# Patient Record
Sex: FEMALE
[De-identification: names, ages, dates, MRNs, and addresses within clinical notes are randomized per-mention and may not be internally consistent; named-entity substitution may affect disease eponyms.]

---

## 2018-05-08 ENCOUNTER — HOSPITAL ENCOUNTER (EMERGENCY)
Dept: HOSPITAL 97 - ER | Age: 43
LOS: 1 days | Discharge: HOME | End: 2018-05-09
Payer: SELF-PAY

## 2018-05-08 DIAGNOSIS — D64.9: Primary | ICD-10-CM

## 2018-05-08 DIAGNOSIS — N92.0: ICD-10-CM

## 2018-05-08 LAB
BLD SMEAR INTERP: (no result)
BUN BLD-MCNC: 14 MG/DL (ref 6–20)
GLUCOSE SERPLBLD-MCNC: 125 MG/DL (ref 65–120)
HCT VFR BLD CALC: 21.4 % (ref 36–45)
HYPOCHROMIA BLD QL: (no result)
LYMPHOCYTES # SPEC AUTO: 1.3 K/UL (ref 0.7–4.9)
MCH RBC QN AUTO: 20.8 PG (ref 27–35)
MCV RBC: 68.2 FL (ref 80–100)
MORPHOLOGY BLD-IMP: (no result)
OVALOCYTES BLD QL SMEAR: (no result)
PMV BLD: 9.1 FL (ref 7.6–11.3)
POLYCHROMASIA BLD QL SMEAR: (no result)
POTASSIUM SERPL-SCNC: 3.3 MEQ/L (ref 3.6–5)
RBC # BLD: 3.14 M/UL (ref 3.86–4.86)

## 2018-05-08 PROCEDURE — 86850 RBC ANTIBODY SCREEN: CPT

## 2018-05-08 PROCEDURE — 81025 URINE PREGNANCY TEST: CPT

## 2018-05-08 PROCEDURE — 36415 COLL VENOUS BLD VENIPUNCTURE: CPT

## 2018-05-08 PROCEDURE — 85018 HEMOGLOBIN: CPT

## 2018-05-08 PROCEDURE — 36430 TRANSFUSION BLD/BLD COMPNT: CPT

## 2018-05-08 PROCEDURE — 80048 BASIC METABOLIC PNL TOTAL CA: CPT

## 2018-05-08 PROCEDURE — 86900 BLOOD TYPING SEROLOGIC ABO: CPT

## 2018-05-08 PROCEDURE — 85025 COMPLETE CBC W/AUTO DIFF WBC: CPT

## 2018-05-08 PROCEDURE — 76830 TRANSVAGINAL US NON-OB: CPT

## 2018-05-08 PROCEDURE — 86901 BLOOD TYPING SEROLOGIC RH(D): CPT

## 2018-05-08 PROCEDURE — 99284 EMERGENCY DEPT VISIT MOD MDM: CPT

## 2018-05-08 PROCEDURE — 81003 URINALYSIS AUTO W/O SCOPE: CPT

## 2018-05-08 NOTE — RAD REPORT
EXAM DESCRIPTION:  US - Transvaginal Study Probe - 5/8/2018 7:40 pm

 

CLINICAL HISTORY:  Abdominal pain with vaginal bleeding

 

COMPARISON:  none

 

FINDINGS:  The uterus measures 8 x 5 x 5cm. A fibroid is not seen. The endometrial stripe measures 2.
4 centimeters.

 

A 3.6 centimeter left ovarian cyst is present. The right ovary was not seen. .

 

No significant free fluid is seen.

 

IMPRESSION:  Thickened endometrium. A followup pelvic ultrasound in a couple months is recommended to
 assess stability/ resolution

 

3.6 centimeter left ovarian cyst. This also can be monitored on the follow-up exam

## 2018-05-09 PROCEDURE — 30233N1 TRANSFUSION OF NONAUTOLOGOUS RED BLOOD CELLS INTO PERIPHERAL VEIN, PERCUTANEOUS APPROACH: ICD-10-PCS

## 2018-05-09 NOTE — ER
Nurse's Notes                                                                                     

 [facility]                                                                

Name: Breann Garcia                                                                                

Age: 42 yrs                                                                                       

Sex: Female                                                                                       

: 1975                                                                                   

MRN: [id number]                                                                                   

Arrival Date: 2018                                                                          

Time: 17:29                                                                                       

Account#: [id number]                                                                            

Bed 5                                                                                             

Private MD:                                                                                       

Diagnosis: Acute anemia;Abnormal uterine and vaginal bleeding, unspecified;Menorrhagia            

                                                                                                  

Presentation:                                                                                     

                                                                                             

17:38 Presenting complaint: Patient states: i fell dizzy starting 2 weeks ago; i had my       hj  

      period for 3 weeks and still heavy; denies nausea; reports vomiting; reports headache;.     

      Transition of care: patient was not received from another setting of care. Onset of         

      symptoms was May 08, 2018. Initial Sepsis Screen: Does the patient meet any 2 criteria?     

      No. Patient's initial sepsis screen is negative. Does the patient have a suspected          

      source of infection? No. Patient's initial sepsis screen is negative. Care prior to         

      arrival: None.                                                                              

17:38 Method Of Arrival: Ambulatory                                                             

17:38 Acuity: SHANELL 3                                                                           hj  

                                                                                                  

Triage Assessment:                                                                                

17:40 General: Appears in no apparent distress. uncomfortable, Behavior is calm, cooperative, hj  

      appropriate for age. Pain: Complains of pain in head.                                       

                                                                                                  

OB/GYN:                                                                                           

17:41 LMP 2018                                                                             

                                                                                                  

Historical:                                                                                       

- Allergies:                                                                                      

17:40 No Known Allergies;                                                                     hj  

- Home Meds:                                                                                      

17:40 Iron CR Oral [Active];                                                                  hj  

- PMHx:                                                                                           

17:40 Anemia; Migraines;                                                                      hj  

- PSHx:                                                                                           

17:40 None;                                                                                   hj  

                                                                                                  

- Immunization history:: Adult Immunizations up to date.                                          

- Social history:: Smoking status: Patient/guardian denies using tobacco.                         

                                                                                                  

                                                                                                  

Screenin:22 Abuse screen: Denies threats or abuse. Nutritional screening: No deficits noted.        tw2 

      Tuberculosis screening: No symptoms or risk factors identified. Fall Risk None              

      identified.                                                                                 

                                                                                                  

Assessment:                                                                                       

18:00 General: Appears in no apparent distress. slender, well groomed, Behavior is calm,      tw2 

      cooperative, appropriate for age. Pain: Denies pain. Neuro: Level of Consciousness is       

      awake, alert, obeys commands, Oriented to person, place, time, situation.                   

      Cardiovascular: Denies chest pain, shortness of breath, Heart tones S1 S2 Capillary         

      refill < 3 seconds Patient's skin is warm and dry. Respiratory: Airway is patent            

      Respiratory effort is even, unlabored, Respiratory pattern is regular, symmetrical,         

      Breath sounds are clear bilaterally. GI: No signs and/or symptoms were reported             

      involving the gastrointestinal system. Abdomen is flat, Bowel sounds present X 4 quads.     

      : Reports vaginal bleeding that is bright red, heavy flow for 3 weeks. EENT: No signs     

      and/or symptoms were reported regarding the EENT system. Derm: Skin is intact, is           

      healthy with good turgor, Skin is pale. Musculoskeletal: Range of motion: intact in all     

      extremities.                                                                                

19:10 Reassessment: Pt stated she was nauseated but denied wanting medication. Medication     tl2 

      ordered per PA.                                                                             

19:20 Reassessment: Pt out to US, will sent blood slips when pt returns to room. General:     tl2 

      Appears in no apparent distress. uncomfortable, Behavior is calm, cooperative,              

      appropriate for age. General:. Pain: Denies pain. Neuro: Level of Consciousness is          

      awake, alert, obeys commands, Oriented to person, place, time, situation. Neuro:            

      Reports weakness. Cardiovascular: Denies chest pain, shortness of breath, Heart tones       

      S1 S2 present. Respiratory: Airway is patent Respiratory effort is even, unlabored,         

      Respiratory pattern is regular, symmetrical. : Reports vaginal bleeding that is           

      bright red, heavy flow. Derm: Skin is intact, Skin is pale. Musculoskeletal: Range of       

      motion: intact in all extremities.                                                          

20:17 Reassessment: blood transfusion started at , see transfusion flowsheet for vitals.  tl2 

      Pt states she is feeling fine after first 15 minutes. Encouraged to notify me if            

      experiencing any symptoms.                                                                  

21:05 Reassessment: Patient appears in no apparent distress at this time. Patient and/or      tl2 

      family updated on plan of care and expected duration. Pain level reassessed. Patient is     

      alert, oriented x 3, equal unlabored respirations, skin warm/dry/pink. PA states that       

      we will infuse both units of blood, recheck H\T\H 2 hours post transfusion and evaluate     

      for discharge.                                                                              

22:48 Reassessment: Patient appears in no apparent distress at this time. Patient and/or      tl2 

      family updated on plan of care and expected duration. Pain level reassessed. Patient is     

      alert, oriented x 3, equal unlabored respirations, skin warm/dry/pink. Skin color is        

      improving.                                                                                  

23:41 Reassessment: Patient appears in no apparent distress at this time. Patient and/or      tl2 

      family updated on plan of care and expected duration. Pain level reassessed. Patient is     

      alert, oriented x 3, equal unlabored respirations, skin warm/dry/pink. Will redraw CBC      

      at 0100. Second unit completed at 2340. Patient states feeling better.                      

                                                                                             

00:24 Reassessment: ambulated pt in room, pt denies dizziness and states she feels better. Pt tl2 

      states that pad she put on when she arrived to ER was not saturated and she reports         

      decreased bleeding.                                                                         

00:53 Reassessment: Patient appears in no apparent distress at this time. Patient and/or      tl2 

      family updated on plan of care and expected duration. Pain level reassessed. Patient is     

      alert, oriented x 3, equal unlabored respirations, skin warm/dry/pink. Patient states       

      feeling better. Reassessment: awaiting Hgb level. Derm: Skin is pink, warm \T\ dry.         

02:03 Reassessment: Patient appears in no apparent distress at this time. Patient and/or      mg2 

      family updated on plan of care and expected duration. Pain level reassessed. Patient is     

      alert, oriented x 3, equal unlabored respirations, skin warm/dry/pink. Pt verbalized        

      understanding of discharge instructions, need for follow up with OBGYN. Pt was              

      ambulatory out of ER with no issue Patient states feeling better.                           

                                                                                                  

Vital Signs:                                                                                      

                                                                                             

17:41  / 67; Pulse 76; Resp 18; Temp 97.8(TE); Pulse Ox 99% on R/A; Weight 66.22 kg;    hj  

      Height 5 ft. 3 in. (160.02 cm); Pain 7/10;                                                  

18:26  / 73; Pulse 74; Resp 16; Pulse Ox 100% on R/A;                                   tw2 

20:09  / 69; Pulse 75; Resp 16; Pulse Ox 100% on R/A;                                   aa1 

20:48  / 71; Pulse 74; Resp 18; Pulse Ox 99% on R/A;                                    aa1 

22:00  / 64; Pulse 79; Resp 18; Pulse Ox 100% on R/A; Pain 0/10;                        mg2 

23:00  / 62; Pulse 74; Resp 16; Pulse Ox 100% on R/A;                                   aa1 

                                                                                             

00:53  / 63; Pulse 65; Resp 18; Pulse Ox 100% on R/A;                                   tl2 

02:03  / 65; Pulse 65; Resp 16; Pulse Ox 100% on R/A; Pain 0/10;                        mg2 

                                                                                             

17:41 Body Mass Index 25.86 (66.22 kg, 160.02 cm)                                               

                                                                                                  

ED Course:                                                                                        

                                                                                             

17:29 Patient arrived in ED.                                                                  sb2 

17:40 Triage completed.                                                                       hj  

17:41 Arm band placed on left wrist.                                                          hj  

17:42 Placed in gown. Bed in low position. Side rails up X2. Pulse ox on. NIBP on. Warm       tw2 

      blanket given.                                                                              

17:45 Aaln Cuba PA is PHCP.                                                               jr8 

17:45 Marc Cheng MD is Attending Physician.                                              jr8 

17:45 Aga Ervin RN is Primary Nurse.                                                        tw2 

18:10 No provider procedures requiring assistance completed. Inserted saline lock: 20 gauge   tw2 

      in left antecubital area, using aseptic technique. Blood collected.                         

19:02 Primary Nurse role handed off by Aga Ervin RN                                         tw2 

19:02 Report given to REJI Olmedo.                                                              tw2 

19:19 Shara Davalos RN is Primary Nurse.                                                      tl2 

19:37 Ultrasound completed.                                                                   cy  

19:40 US Transvaginal Study (Probe) In Process Unspecified.                                   EDMS

                                                                                             

01:06 Hemoglobin: draw at 0100 Sent.                                                          mg2 

02:03 IV discontinued, intact, bleeding controlled, No redness/swelling at site. Pressure     mg2 

      dressing applied.                                                                           

                                                                                                  

Administered Medications:                                                                         

                                                                                             

21:09 Not Given (Patient Refused): Zofran 4 mg IVP once; over 2 minutes                       tl2 

                                                                                                  

                                                                                                  

Medication:                                                                                       

23:42 Blood products: PRBCs X 2 units given. See transfusion record first unit started at     tl2 

       and finished at 2142. Second unit started at 2148 and finished at 2340.                

                                                                                                  

Outcome:                                                                                          

                                                                                             

01:35 Discharge ordered by MD.                                                                jr8 

02:03 Discharged to home ambulatory, with family.                                             mg2 

02:03 Condition: stable                                                                           

02:03 Discharge instructions given to patient, Instructed on discharge instructions, follow       

      up and referral plans. Demonstrated understanding of instructions, follow-up care.          

02:05 Patient left the ED.                                                                    mg2 

                                                                                                  

Signatures:                                                                                       

Dispatcher [employer]                           EDMS                                                 

Aby Marshall RN                        RN   aa1                                                  

Alan Cuba PA                        PA   jr8                                                  

Rocky, Piyush, RN                      Aga Boykin RN                          RN   tw2                                                  

Shara Davalos RN                        RN   tl2                                                  

Bob Barbour Sheri                               2                                                  

Carroll Chandra RN                    RN   mg2                                                  

                                                                                                  

Corrections: (The following items were deleted from the chart)                                    

                                                                                             

17:43 17:41 Pulse 76bpm; Resp 18bpm; Pulse Ox 99% RA; Temp 97.8F Temporal; 66.22 kg; Height 5 hj  

      ft. 3 in.; BMI: 25.8; Pain 7/10; hj                                                         

23:43 20:00 Blood products: PRBCs X 1 unit given. tl2                                         tl2 

                                                                                                  

**************************************************************************************************

## 2018-05-09 NOTE — EDPHYS
Physician Documentation                                                                           

 [facility]                                                                

Name: Breann Garcia                                                                                

Age: 42 yrs                                                                                       

Sex: Female                                                                                       

: 1975                                                                                   

MRN: [id number]                                                                                   

Arrival Date: 2018                                                                          

Time: 17:29                                                                                       

Account#: [id number]                                                                            

Bed 5                                                                                             

Private MD:                                                                                       

ED Physician Marc Cheng                                                                       

HPI:                                                                                              

                                                                                             

19:59 This 42 yrs old  Female presents to ER via Ambulatory with complaints of        jr8 

      Weakness, Dizziness, vaginal bleeding.                                                      

19:59 The patient presents with vaginal bleeding that is moderate, with clots. Onset: The     jr8 

      symptoms/episode began/occurred gradually, 3 week(s) ago. Modifying factors: The            

      symptoms are alleviated by nothing, the symptoms are aggravated by nothing. Associated      

      signs and symptoms: Pertinent positives: weakness, dizziness, MCGRATH. Severity of              

      symptoms: At their worst the symptoms were moderate, in the emergency department the        

      symptoms are unchanged. The patient has not experienced similar symptoms in the past.       

      The patient has been recently seen by a physician:. Recently saw PCP and was started on     

      birth control. Stated that bleeding has decreased since then but still continues .          

                                                                                                  

OB/GYN:                                                                                           

17:41 LMP 2018                                                                           hj  

                                                                                                  

Historical:                                                                                       

- Allergies:                                                                                      

17:40 No Known Allergies;                                                                     hj  

- Home Meds:                                                                                      

17:40 Iron CR Oral [Active];                                                                  hj  

- PMHx:                                                                                           

17:40 Anemia; Migraines;                                                                      hj  

- PSHx:                                                                                           

17:40 None;                                                                                   hj  

                                                                                                  

- Immunization history:: Adult Immunizations up to date.                                          

- Social history:: Smoking status: Patient/guardian denies using tobacco.                         

                                                                                                  

                                                                                                  

ROS:                                                                                              

19:59 Eyes: Negative for injury, pain, redness, and discharge, ENT: Negative for injury,      jr8 

      pain, and discharge, Neck: Negative for injury, pain, and swelling, Cardiovascular:         

      Negative for chest pain, palpitations, and edema, Abdomen/GI: Negative for abdominal        

      pain, nausea, vomiting, diarrhea, and constipation, Back: Negative for injury and pain,     

      MS/Extremity: Negative for injury and deformity, Skin: Negative for injury, rash, and       

      discoloration, Neuro: Negative for headache, weakness, numbness, tingling, and seizure.     

19:59 Constitutional: Positive for fatigue.                                                       

19:59 Respiratory: Positive for dyspnea on exertion, Negative for cough, sputum production,       

      wheezing.                                                                                   

19:59 : Positive for vaginal bleeding, menstrual abnormality.                                   

                                                                                                  

Exam:                                                                                             

19:59 Head/Face:  Normocephalic, atraumatic. ENT:  Nares patent. No nasal discharge, no       jr8 

      septal abnormalities noted.  Tympanic membranes are normal and external auditory canals     

      are clear.  Oropharynx with no redness, swelling, or masses, exudates, or evidence of       

      obstruction, uvula midline.  Mucous membranes moist. Neck:  Trachea midline, no             

      thyromegaly or masses palpated, and no cervical lymphadenopathy.  Supple, full range of     

      motion without nuchal rigidity, or vertebral point tenderness.  No Meningismus.             

      Cardiovascular:  Regular rate and rhythm with a normal S1 and S2.  No gallops, murmurs,     

      or rubs.  Normal PMI, no JVD.  No pulse deficits. Respiratory:  Lungs have equal breath     

      sounds bilaterally, clear to auscultation and percussion.  No rales, rhonchi or wheezes     

      noted.  No increased work of breathing, no retractions or nasal flaring. Abdomen/GI:        

      Soft, non-tender, with normal bowel sounds.  No distension or tympany.  No guarding or      

      rebound.  No evidence of tenderness throughout. Back:  No spinal tenderness.  No            

      costovertebral tenderness.  Full range of motion. MS/ Extremity:  Pulses equal, no          

      cyanosis.  Neurovascular intact.  Full, normal range of motion. Neuro:  Awake and           

      alert, GCS 15, oriented to person, place, time, and situation.  Cranial nerves II-XII       

      grossly intact.  Motor strength 5/5 in all extremities.  Sensory grossly intact.            

      Cerebellar exam normal.  Normal gait.                                                       

19:59 Eyes: Periorbital structures: appear normal, Pupils: equal, round, and reactive to          

      light and accomodation, Extraocular movements: intact throughout, Conjunctiva: pale,        

      bilaterally, Sclera: no appreciated abnormality, Anterior chamber: normal, Lids and         

      lashes: appear normal.                                                                      

19:59 Skin: Appearance: Color: pale, Temperature: cool, Moisture: normal moisture, petechiae,     

      not noted.                                                                                  

                                                                                                  

Vital Signs:                                                                                      

17:41  / 67; Pulse 76; Resp 18; Temp 97.8(TE); Pulse Ox 99% on R/A; Weight 66.22 kg;    hj  

      Height 5 ft. 3 in. (160.02 cm); Pain 7/10;                                                  

18:26  / 73; Pulse 74; Resp 16; Pulse Ox 100% on R/A;                                   tw2 

20:09  / 69; Pulse 75; Resp 16; Pulse Ox 100% on R/A;                                   aa1 

20:48  / 71; Pulse 74; Resp 18; Pulse Ox 99% on R/A;                                    aa1 

22:00  / 64; Pulse 79; Resp 18; Pulse Ox 100% on R/A; Pain 0/10;                        mg2 

23:00  / 62; Pulse 74; Resp 16; Pulse Ox 100% on R/A;                                   aa1 

05                                                                                             

00:53  / 63; Pulse 65; Resp 18; Pulse Ox 100% on R/A;                                   tl2 

02:03  / 65; Pulse 65; Resp 16; Pulse Ox 100% on R/A; Pain 0/10;                        mg2 

                                                                                             

17:41 Body Mass Index 25.86 (66.22 kg, 160.02 cm)                                             hj  

                                                                                                  

MDM:                                                                                              

                                                                                             

17:45 Patient medically screened.                                                             jr8 

                                                                                             

01:33 Data reviewed: vital signs, nurses notes, lab test result(s), and as a result, I will   jr8 

      discharge patient. Data interpreted: Pulse oximetry: on room air is 100 %.                  

      Interpretation: normal. Counseling: I had a detailed discussion with the patient and/or     

      guardian regarding: the historical points, exam findings, and any diagnostic results        

      supporting the discharge/admit diagnosis, lab results, radiology results, the need for      

      outpatient follow up, an OB/Gyne specialist, to return to the emergency department if       

      symptoms worsen or persist or if there are any questions or concerns that arise at          

      home. Response to treatment: the patient's symptoms have markedly improved after            

      treatment. ED course: Patients Hemoglobin to reasonable level now. Patient feeling much     

      better. Bleeding has slowed down. Has appointment with OB tomorrow. To come back if         

      feeling worse again .                                                                       

                                                                                                  

                                                                                             

17:54 Order name: CBC with Diff; Complete Time: 19:56                                         [facility] 

                                                                                             

17:54 Order name: Basic Metabolic Panel; Complete Time: 18:30                                 [facility] 

                                                                                             

17:54 Order name: T\T\S                                                                         [facility]

                                                                                             

18:23 Order name: Urine Dipstick--Ancillary (enter results); Complete Time: 19:13             ag  

                                                                                             

18:23 Order name: Urine Pregnancy--Ancillary (enter results); Complete Time: 19:13            ag  

                                                                                             

18:37 Order name: CBC Smear Scan; Complete Time: 19:56                                        [facility]

                                                                                             

17:54 Order name: Urine Pregnancy Test (obtain specimen); Complete Time: 18:23                [facility] 

                                                                                             

18:42 Order name: Bb Add On                                                                   ag  

                                                                                             

18:52 Order name: ABO/RH no charge; Complete Time: 18:59                                      [facility]

                                                                                             

18:59 Order name: Packed RBC Leukored AS-1                                                    [facility]

                                                                                             

19:11 Order name: US Transvaginal Study (Probe); Complete Time: 20:20                         [facility] 

                                                                                             

00:24 Order name: Hemoglobin: draw at 0100; Complete Time: 01:38                              tl2 

                                                                                             

17:54 Order name: Urine Dipstick-Ancillary (obtain specimen); Complete Time: 18:23            jr8 

                                                                                                  

Administered Medications:                                                                         

                                                                                             

21:09 Not Given (Patient Refused): Zofran 4 mg IVP once; over 2 minutes                       tl2 

                                                                                                  

                                                                                                  

Disposition:                                                                                      

18 01:35 Discharged to Home. Impression: Acute anemia, Abnormal uterine and vaginal         

  bleeding, unspecified, Menorrhagia .                                                            

- Condition is Stable.                                                                            

- Discharge Instructions: Abnormal Uterine Bleeding, Iron Deficiency Anemia, Adult.               

                                                                                                  

- Medication Reconciliation Form, Thank You Letter, Antibiotic Education, Prescription            

  Opioid Use form.                                                                                

- Follow up: Private Physician; When: Today; Reason: Recheck today's complaints,                  

  Continuance of care, Re-evaluation by your physician.                                           

- Problem is new.                                                                                 

- Symptoms have improved.                                                                         

                                                                                                  

                                                                                                  

                                                                                                  

Addendum:                                                                                         

2018                                                                                        

     21:54 Co-signature as Attending Physician, Marc Cheng MD.                                  g
s

                                                                                                  

Signatures:                                                                                       

Dispatcher [employer]                           [facility]                                                 

Alan Cuba PA                        PA   jr8                                                  

Piyush Hidalgo RN RN                                                      

Aga Ervin RN                          RN   tw2                                                  

Marc Cheng MD MD                                                      

Carroll Chandra RN                    RN   [facility]                                                  

Shara Davalos RN   tl2                                                  

                                                                                                  

Corrections: (The following items were deleted from the chart)                                    

                                                                                             

19:15 19:00 Transvaginal Ob+US.RAD.BRZ ordered. [facility]

                                                                                             

01:36 01:35 2018 01:35 Discharged to Home. Impression: Acute anemia; Abnormal uterine   jr8 

      and vaginal bleeding, unspecified. Condition is Stable. Forms are Medication                

      Reconciliation Form, Thank You Letter, Antibiotic Education, Prescription Opioid Use.       

      Follow up: Private Physician; When: Today; Reason: Recheck today's complaints,              

      Continuance of care, Re-evaluation by your physician. Problem is new. Symptoms have         

      improved. jr8                                                                               

02:05 01:36 2018 01:35 Discharged to Home. Impression: Acute anemia; Abnormal uterine   mg2 

      and vaginal bleeding, unspecified; Menorrhagia . Condition is Stable. Forms are             

      Medication Reconciliation Form, Thank You Letter, Antibiotic Education, Prescription        

      Opioid Use. Follow up: Private Physician; When: Today; Reason: Recheck today's              

      complaints, Continuance of care, Re-evaluation by your physician. Problem is new.           

      Symptoms have improved. jr8                                                                 

                                                                                                  

**************************************************************************************************

## 2018-05-10 ENCOUNTER — HOSPITAL ENCOUNTER (EMERGENCY)
Dept: HOSPITAL 97 - ER | Age: 43
LOS: 1 days | Discharge: HOME | End: 2018-05-11
Payer: SELF-PAY

## 2018-05-10 DIAGNOSIS — R06.4: Primary | ICD-10-CM

## 2018-05-10 LAB
ALBUMIN SERPL BCP-MCNC: 4 G/DL (ref 3.2–5.5)
ALP SERPL-CCNC: 49 IU/L (ref 42–121)
ALT SERPL W P-5'-P-CCNC: 14 IU/L (ref 10–60)
ANISOCYTOSIS BLD QL: (no result)
AST SERPL W P-5'-P-CCNC: 20 IU/L (ref 10–42)
BLD SMEAR INTERP: (no result)
BUN BLD-MCNC: 12 MG/DL (ref 6–20)
GLUCOSE SERPLBLD-MCNC: 107 MG/DL (ref 65–120)
HCT VFR BLD CALC: 28.7 % (ref 36–45)
HYPOCHROMIA BLD QL: (no result)
INR BLD: 0.95
LYMPHOCYTES # SPEC AUTO: 2.3 K/UL (ref 0.7–4.9)
MAGNESIUM SERPL-MCNC: 2 MG/DL (ref 1.8–2.5)
MCH RBC QN AUTO: 23.1 PG (ref 27–35)
MCV RBC: 72.3 FL (ref 80–100)
MORPHOLOGY BLD-IMP: (no result)
OVALOCYTES BLD QL SMEAR: (no result)
PMV BLD: 9.1 FL (ref 7.6–11.3)
POTASSIUM SERPL-SCNC: 3.5 MEQ/L (ref 3.6–5)
RBC # BLD: 3.97 M/UL (ref 3.86–4.86)

## 2018-05-10 PROCEDURE — 85610 PROTHROMBIN TIME: CPT

## 2018-05-10 PROCEDURE — 84484 ASSAY OF TROPONIN QUANT: CPT

## 2018-05-10 PROCEDURE — 85025 COMPLETE CBC W/AUTO DIFF WBC: CPT

## 2018-05-10 PROCEDURE — 93005 ELECTROCARDIOGRAM TRACING: CPT

## 2018-05-10 PROCEDURE — 83880 ASSAY OF NATRIURETIC PEPTIDE: CPT

## 2018-05-10 PROCEDURE — 80048 BASIC METABOLIC PNL TOTAL CA: CPT

## 2018-05-10 PROCEDURE — 80076 HEPATIC FUNCTION PANEL: CPT

## 2018-05-10 PROCEDURE — 71275 CT ANGIOGRAPHY CHEST: CPT

## 2018-05-10 PROCEDURE — 85379 FIBRIN DEGRADATION QUANT: CPT

## 2018-05-10 PROCEDURE — 83735 ASSAY OF MAGNESIUM: CPT

## 2018-05-10 PROCEDURE — 81003 URINALYSIS AUTO W/O SCOPE: CPT

## 2018-05-10 PROCEDURE — 81025 URINE PREGNANCY TEST: CPT

## 2018-05-10 PROCEDURE — 85730 THROMBOPLASTIN TIME PARTIAL: CPT

## 2018-05-10 PROCEDURE — 71045 X-RAY EXAM CHEST 1 VIEW: CPT

## 2018-05-10 PROCEDURE — 99285 EMERGENCY DEPT VISIT HI MDM: CPT

## 2018-05-10 PROCEDURE — 36415 COLL VENOUS BLD VENIPUNCTURE: CPT

## 2018-05-11 NOTE — ER
Nurse's Notes                                                                                     

 [facility]                                                                

Name: Breann Garcia                                                                                

Age: 42 yrs                                                                                       

Sex: Female                                                                                       

: 1975                                                                                   

MRN: [id number]                                                                                   

Arrival Date: 05/10/2018                                                                          

Time: 21:55                                                                                       

Account#: [id number]                                                                            

Bed 18                                                                                            

Private MD:                                                                                       

Diagnosis: Chest pain, unspecified;Hyperventilation                                               

                                                                                                  

Presentation:                                                                                     

05/10                                                                                             

22:09 Presenting complaint: Patient states: Chest pain and SOB that started today. Patient    aj  

      seen in this ER 2 days ago and given 2 units PRBC for irregular uterine bleeding.           

      Patient given depo shot yesterday, reports bleeding has stopped. Transition of care:        

      patient was not received from another setting of care. Onset of symptoms was May 10,        

      2018. Initial Sepsis Screen: Does the patient meet any 2 criteria? No. Patient's            

      initial sepsis screen is negative. Does the patient have a suspected source of              

      infection? No. Patient's initial sepsis screen is negative. Care prior to arrival: None.    

22:09 Method Of Arrival: Wheelchair                                                           aj  

22:09 Acuity: SHANELL 2                                                                           aj  

                                                                                                  

Triage Assessment:                                                                                

22:11 General: Appears in no apparent distress. uncomfortable, Behavior is calm, cooperative, aj  

      appropriate for age. Pain: Complains of pain in chest. Neuro: Level of Consciousness is     

      awake, alert, obeys commands, Oriented to person, place, time, situation, Appropriate       

      for age. Cardiovascular: Reports chest pain, shortness of breath. Respiratory: Reports      

      shortness of breath at rest pain with respiration Airway is patent Trachea midline          

      Respiratory effort is even, unlabored, Respiratory pattern is symmetrical, tachypnea.       

      Derm: Skin is intact, is healthy with good turgor, Skin is pink, warm \T\ dry. normal.      

                                                                                                  

OB/GYN:                                                                                           

22:11 LMP N/A - Irregular menses                                                              aj  

                                                                                                  

Historical:                                                                                       

- Allergies:                                                                                      

22:11 No Known Allergies;                                                                     aj  

- Home Meds:                                                                                      

22:11 Iron CR Oral [Active]; Depo-Provera IM [Active];                                        aj  

- PMHx:                                                                                           

22:11 Anemia; Migraines;                                                                      aj  

- PSHx:                                                                                           

22:11 None;                                                                                   aj  

                                                                                                  

- Immunization history:: Adult Immunizations unknown.                                             

- Family history:: not pertinent.                                                                 

- Social history:: Smoking status: unknown.                                                       

- Hospitalizations: : No recent hospitalization is reported.                                      

                                                                                                  

                                                                                                  

Screenin:40 Abuse screen: Denies threats or abuse. Nutritional screening: No deficits noted.        jd3 

      Tuberculosis screening: No symptoms or risk factors identified. Fall Risk IV access (20     

      points). Mental Status- Oriented to own ability (0 pts). Total Gandara Fall Scale             

      indicates No Risk (0-24 pts).                                                               

                                                                                                  

Assessment:                                                                                       

22:37 General: Appears uncomfortable, Behavior is cooperative, appropriate for age, anxious.  jd3 

      Pain: Complains of pain in chest Pain does not radiate. Quality of pain is described as     

      pressure, Pain began 4 hours ago. Neuro: Level of Consciousness is awake, alert, obeys      

      commands, Oriented to person, place, time, situation. Cardiovascular: Heart tones S1 S2     

      present Capillary refill < 3 seconds Patient's skin is warm and dry. Rhythm is regular      

      Chest pain is described as diffuse, quality is pressure, began 4 hours prior to             

      arrival. Respiratory: Reports shortness of breath at rest Airway is patent Respiratory      

      effort is even, unlabored, Respiratory pattern is regular, symmetrical, Breath sounds       

      are clear bilaterally. GI: Abdomen is round Bowel sounds present X 4 quads. Abd is soft     

      and non tender X 4 quads. Reports nausea. : No signs and/or symptoms were reported        

      regarding the genitourinary system. EENT: No signs and/or symptoms were reported            

      regarding the EENT system. Derm: Skin is intact, Skin is diaphoretic, Skin is normal,       

      Skin temperature is warm. Musculoskeletal: Circulation, motion, and sensation intact.       

      Range of motion: intact in all extremities.                                                 

23:40 Reassessment: Patient appears in no apparent distress at this time. Patient and/or      jd3 

      family updated on plan of care and expected duration. Pain level reassessed. Patient is     

      alert, oriented x 3, equal unlabored respirations, skin warm/dry/pink. pt resting in        

      bed with eyes closed even and unlabored respirations, call light in reach, no signs of      

      distress noted at this time. family at bedside.                                             

                                                                                             

00:50 Reassessment: Patient appears in no apparent distress at this time. Patient and/or      jd3 

      family updated on plan of care and expected duration. Pain level reassessed. Patient is     

      alert, oriented x 3, equal unlabored respirations, skin warm/dry/pink. provider at          

      bedside discussing plan of care. Patient states feeling better.                             

01:33 Reassessment: Patient appears in no apparent distress at this time. Patient and/or      jd3 

      family updated on plan of care and expected duration. Pain level reassessed. Patient is     

      alert, oriented x 3, equal unlabored respirations, skin warm/dry/pink. Patient states       

      feeling better.                                                                             

02:33 Reassessment: Patient appears in no apparent distress at this time. Patient and/or      jd3 

      family updated on plan of care and expected duration. Pain level reassessed. Patient is     

      alert, oriented x 3, equal unlabored respirations, skin warm/dry/pink. Patient states       

      feeling better.                                                                             

02:40 Reassessment: Patient appears in no apparent distress at this time. Patient and/or      jd3 

      family updated on plan of care and expected duration. Pain level reassessed. Patient is     

      alert, oriented x 3, equal unlabored respirations, skin warm/dry/pink. pt reported          

      understanding of discharge instructions, even and steady gait upon discharge.               

                                                                                                  

Vital Signs:                                                                                      

05/10                                                                                             

22:11  / 78; Pulse 73; Resp 24; Temp 98.9; Pulse Ox 100% on R/A; Weight 66.22 kg;       aj  

      Height 5 ft. 3 in. (160.02 cm); Pain 5/10;                                                  

23:59  / 67; Pulse 63; Resp 13 S; Pulse Ox 100% on R/A;                                 jd3 

0511                                                                                             

00:50  / 64; Pulse 69; Resp 15 S; Pulse Ox 100% on R/A;                                 jd3 

01:32  / 63; Pulse 61; Resp 16 S; Pulse Ox 100% on R/A; Pain 0/10;                      jd3 

02:32  / 64; Pulse 58; Resp 16 S; Pulse Ox 100% on R/A; Pain 0/10;                      jd3 

05/10                                                                                             

22:11 Body Mass Index 25.86 (66.22 kg, 160.02 cm)                                               

                                                                                                  

ED Course:                                                                                        

05/10                                                                                             

21:55 Patient arrived in ED.                                                                  al2 

22:10 Triage completed.                                                                       aj  

22:11 Arm band placed on right wrist. Patient placed in an exam room.                         aj  

22:15 Oscar Jasmine MD is Attending Physician.                                                rn  

22:25 EKG done, by ED staff, reviewed by Oscar Jasmine MD.                                      cc  

22:30 Initial lab(s) drawn, by me, sent to lab. Inserted saline lock: 20 gauge in left        cc  

      antecubital area, using aseptic technique. Blood collected.                                 

22:37 Lázaro Marshall RN is Primary Nurse.                                                  jd3 

22:39 Patient maintains SpO2 saturation greater than 95% on room air.                         jd3 

22:40 Patient has correct armband on for positive identification. Placed in gown. Bed in low  jd3 

      position. Call light in reach. Side rails up X2. Adult w/ patient.                          

22:40 Cardiac monitor on. Pulse ox on. NIBP on.                                               jd3 

22:44 XRAY Chest (1 view) In Process Unspecified.                                             EDMS

05                                                                                             

00:16 CT Chest For PE Angio In Process Unspecified.                                           EDMS

02:39 No provider procedures requiring assistance completed. IV discontinued, intact,         jd3 

      bleeding controlled, No redness/swelling at site. Pressure dressing applied.                

                                                                                                  

Administered Medications:                                                                         

05/10                                                                                             

23:05 Drug: Valium 2 mg Route: PO;                                                            jd3 

                                                                                             

02:41 Follow up: Response: No adverse reaction; Marked relief of symptoms                     jd3 

                                                                                                  

                                                                                                  

Outcome:                                                                                          

01:45 Discharge ordered by MD.                                                                rn  

02:39 Discharged to home ambulatory, with family.                                             jd3 

02:39 Condition: stable                                                                           

02:39 Discharge instructions given to patient, Instructed on discharge instructions, follow       

      up and referral plans. medication usage, Demonstrated understanding of instructions,        

      follow-up care, medications, Prescriptions given X 1.                                       

02:41 Patient left the ED.                                                                    jd3 

                                                                                                  

Signatures:                                                                                       

Dispatcher [employer]                           Patricia Alatorre RN RN aj Nieto, Roman, MD MD rn Christian, Chelsea cc Davies, Jonathon, RN RN jd3 Love, Angelica al2                                                  

                                                                                                  

**************************************************************************************************

## 2018-05-11 NOTE — EDPHYS
Physician Documentation                                                                           

 [facility]                                                                

Name: Breann Garcia                                                                                

Age: 42 yrs                                                                                       

Sex: Female                                                                                       

: 1975                                                                                   

MRN: [id number]                                                                                   

Arrival Date: 05/10/2018                                                                          

Time: 21:55                                                                                       

Account#: [id number]                                                                            

Bed 18                                                                                            

Private MD:                                                                                       

ED Physician Oscar Jasmine                                                                         

HPI:                                                                                              

05/10                                                                                             

23:37 This 42 yrs old  Female presents to ER via Wheelchair with complaints of Chest  rn  

      Pain, Breathing Difficulty.                                                                 

23:37 The patient or guardian reports chest pain that is located primarily in the chest       rn  

      diffusely. Onset: 2 hour(s) ago. The pain does not radiate. Associated signs and            

      symptoms: Pertinent positives: lightheadedness, palpitations, shortness of breath. The      

      chest pain is described as a heaviness. Duration: The patient or guardian reports           

      multiple episodes, that are intermittent. Severity of pain: At its worst the pain was       

      moderate in the emergency department the pain is unchanged. The patient has not             

      experienced similar symptoms in the past. Recently transfused for anemia from vaginal       

      bleeding, given depot shot recently, presents with 2 hours of chest pain and sob, never     

      had this before, reports making her feel anxious. No fever/cough. .                         

                                                                                                  

OB/GYN:                                                                                           

22:11 LMP N/A - Irregular menses                                                              aj  

                                                                                                  

Historical:                                                                                       

- Allergies:                                                                                      

22:11 No Known Allergies;                                                                     aj  

- Home Meds:                                                                                      

22:11 Iron CR Oral [Active]; Depo-Provera IM [Active];                                        aj  

- PMHx:                                                                                           

22:11 Anemia; Migraines;                                                                      aj  

- PSHx:                                                                                           

22:11 None;                                                                                   aj  

                                                                                                  

- Immunization history:: Adult Immunizations unknown.                                             

- Family history:: not pertinent.                                                                 

- Social history:: Smoking status: unknown.                                                       

- Hospitalizations: : No recent hospitalization is reported.                                      

                                                                                                  

                                                                                                  

ROS:                                                                                              

23:37 Constitutional: Negative for fever, chills, and weight loss, Eyes: Negative for injury, rn  

      pain, redness, and discharge, Cardiovascular: Negative for palpitations, and edema,         

      Respiratory: Negative for cough, wheezing Abdomen/GI: Negative for abdominal pain,          

      nausea, vomiting, diarrhea, and constipation, MS/Extremity: Negative for injury and         

      deformity, Skin: Negative for injury, rash, and discoloration, Neuro: Negative for          

      headache, weakness, numbness, tingling, and seizure.                                        

                                                                                                  

Exam:                                                                                             

23:37 Constitutional:  This is a well developed, well nourished patient who is awake, alert,  rn  

      appears anxious and hyperventilating Head/Face:  Normocephalic, atraumatic. Eyes:           

      Pupils equal round and reactive to light, extra-ocular motions intact.  Lids and lashes     

      normal.  Conjunctiva and sclera are non-icteric and not injected.  Cornea within normal     

      limits.  Periorbital areas with no swelling, redness, or edema. Neck:  Trachea midline,     

      no thyromegaly or masses palpated, and no cervical lymphadenopathy.  Supple, full range     

      of motion without nuchal rigidity, or vertebral point tenderness.  No Meningismus.          

      Cardiovascular:  Regular rate and rhythm with a normal S1 and S2.  No gallops, murmurs,     

      or rubs.  Normal PMI, no JVD.  No pulse deficits. Respiratory:  clear bilaterally, +        

      mild tachypnea, no retractions Abdomen/GI:  Soft, non-tender, with normal bowel sounds.     

       No distension or tympany.  No guarding or rebound.  No evidence of tenderness              

      throughout. Skin:  Warm, dry with normal turgor.  Normal color with no rashes, no           

      lesions, and no evidence of cellulitis. MS/ Extremity:  Pulses equal, no cyanosis.          

      Neurovascular intact.  Full, normal range of motion.  Equal circumference. Neuro:           

      Awake and alert, GCS 15, oriented to person, place, time, and situation.  Cranial           

      nerves II-XII grossly intact.  Motor strength 5/5 in all extremities.  Sensory grossly      

      intact.                                                                                     

                                                                                                  

Vital Signs:                                                                                      

22:11  / 78; Pulse 73; Resp 24; Temp 98.9; Pulse Ox 100% on R/A; Weight 66.22 kg;       aj  

      Height 5 ft. 3 in. (160.02 cm); Pain 5/10;                                                  

23:59  / 67; Pulse 63; Resp 13 S; Pulse Ox 100% on R/A;                                 jd3 

                                                                                             

00:50  / 64; Pulse 69; Resp 15 S; Pulse Ox 100% on R/A;                                 jd3 

01:32  / 63; Pulse 61; Resp 16 S; Pulse Ox 100% on R/A; Pain 0/10;                      jd3 

02:32  / 64; Pulse 58; Resp 16 S; Pulse Ox 100% on R/A; Pain 0/10;                      jd3 

05/10                                                                                             

22:11 Body Mass Index 25.86 (66.22 kg, 160.02 cm)                                             aj  

                                                                                                  

MDM:                                                                                              

05/10                                                                                             

22:15 Patient medically screened.                                                             rn  

                                                                                             

01:01 ED course: Pt feels much better after valium, offered observation for chest pain rule   rn  

      out, patient prefers to go home, will repeat trop and if normal dc home, pt and      

      state this happens about once a year, gets better, has been evaluated before and            

      possibly anxiety. Given her emotional response, hyperventilation, and improvement with      

      valium and lack of findings in radiologic and laboratory evaluation, very likely that       

      it is hyperventilation/anxiety. .                                                           

01:40 Differential diagnosis: anxiety, coronary artery disease chest wall pain,               rn  

      costochondritis, gastritis, gastroesophageal reflux disease (GERD), pericarditis,           

      pleurisy, pneumothorax, pulmonary embolus. Data reviewed: vital signs, nurses notes,        

      lab test result(s), EKG, radiologic studies, CT scan, plain films, and as a result, I       

      will discharge patient. Counseling: I had a detailed discussion with the patient and/or     

      guardian regarding: the historical points, exam findings, and any diagnostic results        

      supporting the discharge/admit diagnosis, lab results, radiology results, the need for      

      outpatient follow up, to return to the emergency department if symptoms worsen or           

      persist or if there are any questions or concerns that arise at home. Response to           

      treatment: the patient's symptoms have markedly improved after treatment, and as a          

      result, I will discharge patient. Special discussion: I discussed with the                  

      patient/guardian in detail that at this point there is no indication for admission to       

      the hospital. It is understood, however, that if the symptoms persist or worsen the         

      patient needs to return immediately for re-evaluation.                                      

                                                                                                  

05/10                                                                                             

22:22 Order name: Basic Metabolic Panel                                                       rn  

05/10                                                                                             

22:22 Order name: BNP; Complete Time: 00:39                                                   rn  

05/10                                                                                             

22:22 Order name: CBC with Diff; Complete Time: 00:39                                         rn  

05/10                                                                                             

22:22 Order name: LFT's; Complete Time: 23:14                                                 rn  

05/10                                                                                             

22:22 Order name: Magnesium; Complete Time: 23:14                                             rn  

05/10                                                                                             

22:22 Order name: PT-INR; Complete Time: 23:14                                                rn  

05/10                                                                                             

22:22 Order name: Ptt, Activated; Complete Time: 23:14                                        rn  

05/10                                                                                             

22:22 Order name: Troponin (emerg Dept Use Only); Complete Time: 00:39                        rn  

05/10                                                                                             

22:22 Order name: D-Dimer; Complete Time: 23:14                                               rn  

05/10                                                                                             

22:23 Order name: Basic Metabolic Panel; Complete Time: 23:14                                 EDMS

05/10                                                                                             

22:55 Order name: CBC Smear Scan; Complete Time: 00:39                                        EDMS

05/10                                                                                             

23:35 Order name: Urine Dipstick--Ancillary (enter results); Complete Time: 00:39             em1 

05/10                                                                                             

23:35 Order name: Urine Pregnancy--Ancillary (enter results); Complete Time: 00:39            em1 

                                                                                             

00:40 Order name: Troponin (emerg Dept Use Only)                                              rn  

05/10                                                                                             

22:22 Order name: Urine Pregnancy Test (obtain specimen); Complete Time: 23:32                rn  

05/10                                                                                             

22:22 Order name: XRAY Chest (1 view)                                                         rn  

05/10                                                                                             

22:22 Order name: EKG; Complete Time: 22:23                                                   rn  

05/10                                                                                             

22:22 Order name: Cardiac monitoring; Complete Time: 22:35                                    rn  

05/10                                                                                             

22:22 Order name: EKG - Nurse/Tech; Complete Time: 22:35                                      rn  

05/10                                                                                             

22:22 Order name: IV Saline Lock; Complete Time: 22:35                                        rn  

05/10                                                                                             

22:22 Order name: Labs collected and sent; Complete Time: 22:35                               rn  

05/10                                                                                             

22:22 Order name: O2 Per Protocol; Complete Time: 22:35                                       rn  

05/10                                                                                             

22:22 Order name: O2 Sat Monitoring; Complete Time: 22:35                                     rn  

05/10                                                                                             

22:22 Order name: Urine Dipstick-Ancillary (obtain specimen); Complete Time: 23:33            rn  

05/10                                                                                             

23:15 Order name: CT Chest For PE Angio                                                       rn  

                                                                                                  

Administered Medications:                                                                         

05/10                                                                                             

23:05 Drug: Valium 2 mg Route: PO;                                                            jd3 

                                                                                             

02:41 Follow up: Response: No adverse reaction; Marked relief of symptoms                     jd3 

                                                                                                  

                                                                                                  

Disposition:                                                                                      

18 01:45 Discharged to Home. Impression: Chest pain, unspecified, Hyperventilation.         

- Condition is Stable.                                                                            

- Discharge Instructions: Nonspecific Chest Pain, Hyperventilation.                               

- Prescriptions for Valium 2 mg Oral Tablet - take 1 tablet by ORAL route one time As             

  needed Moderate to severe hyperventilation; 10 tablet.                                          

- Medication Reconciliation Form, Thank You Letter, Antibiotic Education, Prescription            

  Opioid Use form.                                                                                

- Follow up: Private Physician; When: As needed; Reason: Recheck today's complaints,              

  Re-evaluation by your physician.                                                                

- Problem is new.                                                                                 

- Symptoms have improved.                                                                         

                                                                                                  

                                                                                                  

                                                                                                  

Signatures:                                                                                       

Dispatcher [employer]                           Patricia Alatorre RN RN aj Nieto, Roman, MD MD rn Davies, Jonathon, RN                    RN   jd3                                                  

                                                                                                  

Corrections: (The following items were deleted from the chart)                                    

02:41 01:45 2018 01:45 Discharged to Home. Impression: Chest pain, unspecified;         jd3 

      Hyperventilation. Condition is Stable. Forms are Medication Reconciliation Form, Thank      

      You Letter, Antibiotic Education, Prescription Opioid Use. Follow up: Private               

      Physician; When: As needed; Reason: Recheck today's complaints, Re-evaluation by your       

      physician. Problem is new. Symptoms have improved. rn                                       

                                                                                                  

**************************************************************************************************

## 2018-05-11 NOTE — RAD REPORT
EXAM DESCRIPTION:  CT - Chest For Pe Angio - 5/11/2018 6:30 am

 

CLINICAL HISTORY:  Chest pain.

 

COMPARISON:  None.

 

TECHNIQUE:  CT angiogram of the pulmonary arteries was performed with MIP.

 

All CT scans are performed using dose optimization technique as appropriate and may include automated
 exposure control or mA/KV adjustment according to patient size.

 

FINDINGS:  No evidence of pulmonary thromboembolism.

 

No acute aortic finding demonstrated.

 

The lungs are clear.

 

No significant pericardial or pleural fluid.

 

No concerning bony finding.

 

IMPRESSION:  No evidence of pulmonary thromboembolism.

 

No acute lung findings.

## 2018-05-11 NOTE — EKG
Test Date:    2018-05-10               Test Time:    22:17:33

Technician:   CMC                                    

                                                     

MEASUREMENT RESULTS:                                       

Intervals:                                           

Rate:         82                                     

TX:           144                                    

QRSD:         78                                     

QT:           370                                    

QTc:          432                                    

Axis:                                                

P:            53                                     

TX:           144                                    

QRS:          61                                     

T:            50                                     

                                                     

INTERPRETIVE STATEMENTS:                                       

                                                     

Normal sinus rhythm

Normal ECG

Compared to ECG 09/14/2016 01:42:31

No significant changes



Electronically Signed On 05-11-18 10:27:09 CDT by Catarino Beaver

## 2018-05-11 NOTE — RAD REPORT
EXAM DESCRIPTION:  RAD - Chest Single View - 5/10/2018 10:44 pm

 

CLINICAL HISTORY:  Chest pain.

 

COMPARISON:  09/14/2016

 

FINDINGS:  Portable technique limits examination quality.

 

The lungs are grossly clear. The heart is normal in size. No displaced fractures.

 

IMPRESSION:  No acute intrathoracic process suspected.